# Patient Record
(demographics unavailable — no encounter records)

---

## 2017-02-09 NOTE — EMERGENCY ROOM VISIT NOTE
History


First contact with patient:  23:08


Chief Complaint:  RASH


Stated Complaint:  SKIN BLOTCHING, POSSIBLY FROM CHEMICAL EXPOSURE





History of Present Illness


The patient is a 28 year old male who presents to the Emergency Room with 

complaints of rash to the tip of his third and fifth finger and distal phalanx 

today.  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  He is unsure if he 

came in contact with a certain chemical.  He also touched paint today.  He has 

white spots to this area.  2 mm in diameter.  Patient denies pain, numbness, 

tingling, chest pain, dyspnea, fever, chills.  No new foods soaps or detergents.





Review of Systems


See HPI for pertinent positives & negatives. A total of 10 systems reviewed and 

were otherwise negative.





Past Medical/Surgical History


none





Social History


Smoking Status:  Never Smoker


Smokeless Tobacco Use:  No


Drug Use:  none


Occupation Status:  [employer] student





Physical Exam


Vital Signs











  Date Time  Temp Pulse Resp B/P Pulse Ox O2 Delivery O2 Flow Rate FiO2


 


2/8/17 22:53 36.8 80 19 144/92 100 Room Air  








Pain Rating (0-10):  0





Physical Exam


VITALS: Vitals are noted on the nurse's note and reviewed by myself.  Vital 

signs stable.


GENERAL: Pleasant anxious-appearing male, in no acute distress, nondiaphoretic, 

well-developed well-nourished.


SKIN: Capillary reflex less than 2 seconds.  Left hand third and fifth digits 

distal aspect with 2 mm of white discoloration that was mostly removed using an 

alcohol wipe


HEENT: Normocephalic.  PERRLA.  EOMI.  Nares patent.  Mucous membranes moist.  


MUSCULOSKELETAL: No gross musculoskeletal defects. 


NEURO: Patient was alert and oriented to person place and time.  Normal 

sensation to light and sharp touch.  No focal neurological deficits.





Medical Decision & Procedures


ED Course


Prior records/ancillary studies reviewed.


Triage Nursing notes reviewed.


 





The patient's history was concerning for a rash.





Differential diagnosis:


Etiologies such as contact dermatitis, chemical exposure, paint, viral exanthem

, urticaria, allergic reaction, Garcia-Ruiz syndrome, toxic epidermal 

necrolysis, erythema multiforme, cellulitis, scabies, HSV, varicella, zoster, 

eczema, staph scalded skin syndrome, fungal infection, as well as others were 

entertained.





Physical examination:


As above





ER treatment provided:


Alcohol wipe


On reassessment the patient felt better.





Diagnostic interpretation by me:


Deferred 





The etiology for the patient's rash appears to be consistent with white 

material to the distal fingers.  Most of this was removed using an alcohol 

wipe.  Patient was advised follow-up with health services on Friday for recheck 

or here in the ER sooner if this spreads,  pain, tingling, worsening signs or 

symptoms or as needed.  Patient works in a lab with multiple chemicals.  He 

states he always wears glasses and does not feel that he was exposed to a heavy 

amount of chemicals.  He is unsure though if there was chemicals on the suit 

that he was wearing.  I feel like this is less likely as there is minimal 

distribution of the white discoloration and that it was also removed using an 

alcohol wipe.  He was advised to return to the ER for worsening signs or 

symptoms or as needed.





By the evaluation outlined above emergent etiologies such as Garcia-Ruiz 

syndrome, toxic epidermal necrolysis, erythema multiforme, cellulitis, scabies, 

HSV, varicella, zoster, staph scalded skin syndrome, urticaria, allergic 

reaction, as well as others were deemed relatively unlikely.





The pt informed about the findings as listed above. All questions were answered 

and  pleased with the treatment. Return instructions were outlined and the 

patient was discharged in stable condition.








Referral:


The patient was referred back to their primary care physician for follow-up in 2

-3 days for a recheck of the current condition.





Medical Decision


As above





Impression





 Primary Impression:  


 Rash





Departure Information


Dispostion


Home / Self-Care





Condition


FAIR





Forms


WORK / SCHOOL INSTRUCTIONS, HOME CARE DOCUMENTATION FORM,                      

                                          IMPORTANT VISIT INFORMATION





Patient Instructions


My [employer]





Additional Instructions





Avoid exposure to any chemicals.





Monitor the area.





Follow-up health services in 2 days for recheck.





Return to ER sooner for spreading of rash, fevers, pain, worsening signs or 

symptoms or as needed.

## 2017-06-26 NOTE — EMERGENCY ROOM VISIT NOTE
History


Report prepared by Noe:  Ilene Sepulveda


Under the Supervision of:  Dr. Jovani Hoyt D.O.


First contact with patient:  08:59


Chief Complaint:  MENTAL HEALTH EVALUATION


Stated Complaint:  SUICIDAL THOUGHTS





History of Present Illness


The patient is a 28 year old male who presents to the Emergency Room for a 

mental health evaluation. The patient states that for the past few weeks he has 

been experiencing worsening suicidal ideation. The patient states that he is 

currently thinking of a plan and would like inpatient help before he acts on 

the plan. The patient has a history of depression that began about 2 years ago 

after he had some dating issues. He has attempted suicided 3 times using a 

knife. He notes he does have a knife at home. The patient states that he is 

alone a lot recently and this is causing his mind to wander and his depression 

to worsen. The past month is when he began to notice the depression worsening. 

The patient denies any particular event that caused the depression to worsen. 

He states that he has been off his medications since the beginning of the year 

due to him thinking he was doing better and did not need them anymore. The 

patient was on Zoloft and another medication. The patient currently is in group 

therapy. He saw his therapist Thursday. He denies any other medical problems. 

The patient states that his last inpatient treatment was in [place] in 

August. He was still on his medication at that time.





   Source of History:  patient


   Onset:  few weeks PTA


   Position:  other (global)


   Quality:  other (mental health evaluation)


   Timing:  worsening


Note:


The patient admits to suicidal ideation. He admits to beginning to form a plan. 

The patient denies any other medical problems.





Review of Systems


See HPI for pertinent positives & negatives. A total of 10 systems reviewed and 

were otherwise negative.





Past Medical & Surgical


Medical Problems:


(1) Depression








Family History





Family cardiac history 





Social History


Smoking Status:  Never Smoker


Smokeless Tobacco Use:  No


Alcohol Use:  none


Drug Use:  none


Marital Status:  single


Housing Status:  lives alone


Occupation Status:  [employer] student





Current/Historical Medications


No Active Prescriptions or Reported Meds





Allergies


Coded Allergies:  


     Cefaclor (Verified  Allergy, Unknown, vomiting, 6/26/17)





Physical Exam


Vital Signs











  Date Time  Temp Pulse Resp B/P (MAP) Pulse Ox O2 Delivery O2 Flow Rate FiO2


 


6/26/17 11:40  58 16 130/90 99 Room Air  


 


6/26/17 08:53 36.7 67 18 139/97 99 Room Air  











Physical Exam


GENERAL:  Patient is awake, alert, mildly anxious but comfortable appearing. 


EYES: The conjunctivae are clear.  The pupils are round and reactive. 


EARS, NOSE, MOUTH AND THROAT: The nose is without any evidence of any 

deformity. Mucous membranes are moist tongue is midline 


NECK: The neck is nontender and supple.


RESPIRATORY: Normal respiratory effort is noted there is no evidence of 

wheezing rhonchi or rales


CARDIOVASCULAR:  Regular rate and rhythm noted there no murmurs rubs or gallops 

normal S1 normal S2 


GASTROINTESTINAL: The abdomen is soft. Bowel sounds are present in all 

quadrants. Abdomen is nontender


MUSCULOSKELETAL/EXTREMITIES: There is no evidence of gross deformity full range 

of motion is noted in the hips and shoulders


SKIN: There is no obvious evidence of any rash. There are no petechiae, pallor 

or cyanosis noted. 


NEUROLOGIC:  Patient is awake alert and oriented x3 strength is symmetric 

patellar reflexes are 2+ bilaterally


PSYCH: Affect flat, makes poor eye contact. Patient's speech is normal. He 

currently admits to suicidal ideation with plan to cut himself.





Medical Decision & Procedures


Laboratory Results


6/26/17 09:28








Red Blood Count 6.07, Mean Corpuscular Volume 86.3, Mean Corpuscular Hemoglobin 

29.7, Mean Corpuscular Hemoglobin Concent 34.4, Mean Platelet Volume 9.1, 

Neutrophils (%) (Auto) 43.3, Lymphocytes (%) (Auto) 43.4, Monocytes (%) (Auto) 

9.5, Eosinophils (%) (Auto) 2.4, Basophils (%) (Auto) 0.4, Neutrophils # (Auto) 

3.39, Lymphocytes # (Auto) 3.40, Monocytes # (Auto) 0.74, Eosinophils # (Auto) 

0.19, Basophils # (Auto) 0.03





6/26/17 09:28

















Test


  6/26/17


09:28 6/26/17


09:40


 


White Blood Count


  7.83 K/uL


(4.8-10.8) 


 


 


Red Blood Count


  6.07 M/uL


(4.7-6.1) 


 


 


Hemoglobin


  18.0 g/dL


(14.0-18.0) 


 


 


Hematocrit 52.4 % (42-52)  


 


Mean Corpuscular Volume


  86.3 fL


() 


 


 


Mean Corpuscular Hemoglobin


  29.7 pg


(25-34) 


 


 


Mean Corpuscular Hemoglobin


Concent 34.4 g/dl


(32-36) 


 


 


Platelet Count


  267 K/uL


(130-400) 


 


 


Mean Platelet Volume


  9.1 fL


(7.4-10.4) 


 


 


Neutrophils (%) (Auto) 43.3 %  


 


Lymphocytes (%) (Auto) 43.4 %  


 


Monocytes (%) (Auto) 9.5 %  


 


Eosinophils (%) (Auto) 2.4 %  


 


Basophils (%) (Auto) 0.4 %  


 


Neutrophils # (Auto)


  3.39 K/uL


(1.4-6.5) 


 


 


Lymphocytes # (Auto)


  3.40 K/uL


(1.2-3.4) 


 


 


Monocytes # (Auto)


  0.74 K/uL


(0.11-0.59) 


 


 


Eosinophils # (Auto)


  0.19 K/uL


(0-0.5) 


 


 


Basophils # (Auto)


  0.03 K/uL


(0-0.2) 


 


 


RDW Standard Deviation


  41.7 fL


(36.4-46.3) 


 


 


RDW Coefficient of Variation


  13.2 %


(11.5-14.5) 


 


 


Immature Granulocyte % (Auto) 1.0 %  


 


Immature Granulocyte # (Auto)


  0.08 K/uL


(0.00-0.02) 


 


 


Anion Gap


  7.0 mmol/L


(3-11) 


 


 


Est Creatinine Clear Calc


Drug Dose 83.8 ml/min 


  


 


 


Estimated GFR (


American) 105.3 


  


 


 


Estimated GFR (Non-


American 90.9 


  


 


 


BUN/Creatinine Ratio 11.5 (10-20)  


 


Calcium Level


  8.9 mg/dl


(8.5-10.1) 


 


 


Total Bilirubin


  0.4 mg/dl


(0.2-1) 


 


 


Direct Bilirubin


  0.1 mg/dl


(0-0.2) 


 


 


Aspartate Amino Transf


(AST/SGOT) 23 U/L (15-37) 


  


 


 


Alanine Aminotransferase


(ALT/SGPT) 28 U/L (12-78) 


  


 


 


Alkaline Phosphatase


  70 U/L


() 


 


 


Total Protein


  7.6 gm/dl


(6.4-8.2) 


 


 


Albumin


  4.1 gm/dl


(3.4-5.0) 


 


 


Thyroid Stimulating Hormone


(TSH) 6.850 uIu/ml


(0.300-4.500) 


 


 


Ethyl Alcohol mg/dL


  < 3.0 mg/dl


(0-3) 


 


 


Urine Color  DK YELLOW 


 


Urine Appearance  CLEAR (CLEAR) 


 


Urine pH  5.5 (4.5-7.5) 


 


Urine Specific Gravity


  


  1.029


(1.000-1.030)


 


Urine Protein  NEG (NEG) 


 


Urine Glucose (UA)  NEG (NEG) 


 


Urine Ketones  TRACE (NEG) 


 


Urine Occult Blood  NEG (NEG) 


 


Urine Nitrite  NEG (NEG) 


 


Urine Bilirubin  NEG (NEG) 


 


Urine Urobilinogen  NEG (NEG) 


 


Urine Leukocyte Esterase  NEG (NEG) 


 


Urine Opiates Screen  NEG (NEG) 


 


Urine Methadone, Qualitative  NEG (NEG) 


 


Urine Barbiturates  NEG (NEG) 


 


Urine Phencyclidine (PCP)


Level 


  NEG (NEG) 


 


 


Ur


Amphetamine/Methamphetamine 


  NEG (NEG) 


 


 


MDMA (Ecstasy) Screen  NEG (NEG) 


 


Urine Benzodiazepines Screen  NEG (NEG) 


 


Urine Cocaine Metabolite  NEG (NEG) 


 


Urine Marijuana (THC)  NEG (NEG) 





Laboratory results per my review.





Medications Administered











 Medications


  (Trade)  Dose


 Ordered  Sig/Simran


 Route  Start Time


 Stop Time Status Last Admin


Dose Admin


 


 Lorazepam


  (Ativan Tab)  0.5 mg  NOW  STAT


 SL  6/26/17 09:13


 6/26/17 09:14 DC 6/26/17 09:38


0.5 MG











ED Course


0906: The patient was evaluated in room A6. A complete history and physical 

examination were performed. 





0913: Ativan Tab 0.5 mg IV. 





1015: Bed search will begin. 





1332: The patient will be transferred to [place] for inpatient treatment.





Medical Decision


Differential diagnosis:


Etiologies such as mood disorder, infection, hypoglycemia, electrolyte 

abnormalities, cardiac sources, intracerebral event, toxicologic, neurologic, 

as well as others were entertained.





Medication Reconciliation: I attest that I have personally reviewed the patient'

s current medications list.





Blood pressure screening: Patient was found to have normal blood pressure on 

screening and does not require follow-up.





The patient is a 28-year-old male who presented to the emergency department for 

an evaluation of depression and suicidal ideation. The patient states that he 

has a history of depression and has had problems with suicidal ideation the 

past. The patient states that his symptoms have become worse recently. The 

patient was medically cleared in the emergency department. He was evaluated by 

the mental health delegate. He was felt to be a good candidate for inpatient 

treatment. Bed search was underway and the patient was accepted at [facility] for inpatient psychiatric care. The patient was agreeable with this 

plan. Transfer paperwork was filled out by myself. The patient was treated with 

Ativan in the emergency department.





Impression





 Primary Impression:  


 Depression


 Additional Impression:  


 Suicidal ideation





Scribe Attestation


The scribe's documentation has been prepared under my direction and personally 

reviewed by me in its entirety. I confirm that the note above accurately 

reflects all work, treatment, procedures, and medical decision making performed 

by me.





Departure Information


Dispostion


Mental Health Acute Care





Prescriptions





No Active Prescriptions or Reported Meds





Referrals


No Doctor, Assigned (PCP)





Problem Qualifiers








 Primary Impression:  


 Depression


 Depression Type:  unspecified  Qualified Codes:  F32.9 - Major depressive 

disorder, single episode, unspecified